# Patient Record
Sex: MALE | Race: OTHER
[De-identification: names, ages, dates, MRNs, and addresses within clinical notes are randomized per-mention and may not be internally consistent; named-entity substitution may affect disease eponyms.]

---

## 2018-01-19 ENCOUNTER — HOSPITAL ENCOUNTER (INPATIENT)
Dept: HOSPITAL 72 - SDSOVERFLO | Age: 33
Discharge: HOME | DRG: 520 | End: 2018-01-19
Payer: COMMERCIAL

## 2018-01-19 VITALS — DIASTOLIC BLOOD PRESSURE: 77 MMHG | SYSTOLIC BLOOD PRESSURE: 121 MMHG

## 2018-01-19 VITALS — DIASTOLIC BLOOD PRESSURE: 75 MMHG | SYSTOLIC BLOOD PRESSURE: 119 MMHG

## 2018-01-19 VITALS — SYSTOLIC BLOOD PRESSURE: 118 MMHG | DIASTOLIC BLOOD PRESSURE: 75 MMHG

## 2018-01-19 VITALS — SYSTOLIC BLOOD PRESSURE: 120 MMHG | DIASTOLIC BLOOD PRESSURE: 75 MMHG

## 2018-01-19 VITALS — DIASTOLIC BLOOD PRESSURE: 86 MMHG | SYSTOLIC BLOOD PRESSURE: 117 MMHG

## 2018-01-19 VITALS — SYSTOLIC BLOOD PRESSURE: 118 MMHG | DIASTOLIC BLOOD PRESSURE: 74 MMHG

## 2018-01-19 VITALS — DIASTOLIC BLOOD PRESSURE: 74 MMHG | SYSTOLIC BLOOD PRESSURE: 120 MMHG

## 2018-01-19 VITALS — DIASTOLIC BLOOD PRESSURE: 69 MMHG | SYSTOLIC BLOOD PRESSURE: 105 MMHG

## 2018-01-19 VITALS — SYSTOLIC BLOOD PRESSURE: 120 MMHG | DIASTOLIC BLOOD PRESSURE: 76 MMHG

## 2018-01-19 VITALS — WEIGHT: 182 LBS | HEIGHT: 71 IN | BODY MASS INDEX: 25.48 KG/M2

## 2018-01-19 VITALS — SYSTOLIC BLOOD PRESSURE: 177 MMHG | DIASTOLIC BLOOD PRESSURE: 85 MMHG

## 2018-01-19 DIAGNOSIS — M51.16: Primary | ICD-10-CM

## 2018-01-19 DIAGNOSIS — M48.061: ICD-10-CM

## 2018-01-19 DIAGNOSIS — K21.9: ICD-10-CM

## 2018-01-19 PROCEDURE — 4A11X4G MONITORING OF PERIPHERAL NERVOUS ELECTRICAL ACTIVITY, INTRAOPERATIVE, EXTERNAL APPROACH: ICD-10-PCS

## 2018-01-19 PROCEDURE — 36415 COLL VENOUS BLD VENIPUNCTURE: CPT

## 2018-01-19 PROCEDURE — 86900 BLOOD TYPING SEROLOGIC ABO: CPT

## 2018-01-19 PROCEDURE — 00NY0ZZ RELEASE LUMBAR SPINAL CORD, OPEN APPROACH: ICD-10-PCS

## 2018-01-19 PROCEDURE — 86850 RBC ANTIBODY SCREEN: CPT

## 2018-01-19 PROCEDURE — 86901 BLOOD TYPING SEROLOGIC RH(D): CPT

## 2018-01-19 PROCEDURE — 94003 VENT MGMT INPAT SUBQ DAY: CPT

## 2018-01-19 PROCEDURE — 72020 X-RAY EXAM OF SPINE 1 VIEW: CPT

## 2018-01-19 PROCEDURE — 87081 CULTURE SCREEN ONLY: CPT

## 2018-01-19 PROCEDURE — 0SB20ZZ EXCISION OF LUMBAR VERTEBRAL DISC, OPEN APPROACH: ICD-10-PCS

## 2018-01-19 PROCEDURE — 94150 VITAL CAPACITY TEST: CPT

## 2018-01-19 PROCEDURE — 76001: CPT

## 2018-01-19 RX ADMIN — SODIUM CHLORIDE PRN MG: 9 INJECTION, SOLUTION INTRAVENOUS at 11:35

## 2018-01-19 RX ADMIN — SODIUM CHLORIDE PRN MG: 9 INJECTION, SOLUTION INTRAVENOUS at 11:23

## 2018-01-19 RX ADMIN — SODIUM CHLORIDE PRN MG: 9 INJECTION, SOLUTION INTRAVENOUS at 11:57

## 2018-01-19 NOTE — DISCHARGE SUMMARY
DATE OF ADMISSION:  01/19/2018



DATE OF DISCHARGE:  01/19/2018



PROCEDURE PERFORMED DURING ADMISSION:  L4-L5 bilateral hemilaminotomy and

foraminotomy, left-sided L4-L5 microdiscectomy.



REASON FOR ADMISSION:  Herniated nucleus pulposus, L4-L5.



HOSPITAL COURSE/TREATMENT RENDERED:  _____



DISCHARGE PHYSICAL EXAMINATION:

1. The patient was ambulating with and without the assistance of

physical therapy.

2. Prior to discharge home, incision was clean and dry with minimal

swelling.

3. Follows commands.

4. Alert and oriented.

5. Ugalde discontinued, voiding.

6. Incentive spirometer at bedside.

7. IVF Hep-Locked.

8. Motor demonstrates expected postoperative bulk and tone.  Moves

biceps, triceps, and deltoid musculature on command.  Moves hip flexors,

quadriceps, tibialis anterior, EHL, gastrocsoleus musculature on command

as well.



TREATMENT RENDERED:

1. Daily nursing care.

2. Physical therapy.

3. Occupational therapy.

4. Intravenous medications.

5. Oral medications.

6. Daily postoperative examinations by spine surgery team.



CONDITION OF PATIENT ON DISCHARGE:  The condition on discharge is stable

for discharge to home.



DISCHARGE INSTRUCTIONS:  Our specific instructions relating to physical

activity, medications, diet, and follow-up care are detailed in our

standard operative folder and were given to this patient prior to surgery.

We will however summarize these briefly as stated below.  Regarding

physical activity, we would like the patient to limit their flexion,

extension, and rotation.  We also require a limitation on their bending,

lifting, and twisting.  All medication has been called in prior to surgery

to their pharmacy of choice.  They can resume their regular diet once

tolerated.  We would like them to shower and limit soaking the wound in a

tub/Jacuzzi/the ocean for a period of one month or until the incision is

completely healed.  We will have them follow up in our office in three

weeks' time for their regularly scheduled appointment.  They understand to

call our office tomorrow to schedule the time for their three-week

followup appointment.  The patient will notify us should he experience any

increase in the severity of pain, redness/swelling/drainage from the

incision.









  ______________________________________________

  John Kenny M.D.





DR:  Levi

D:  01/19/2018 15:49

T:  01/19/2018 17:25

JOB#:  [id number]

CC:

## 2018-01-19 NOTE — BRIEF OPERATIVE NOTE
Immediate Post Operative Note


Operative Note


Chief Complaint:  Back and leg pains left sided


Pre-op Diagnosis:


L45 herniation


Procedure:


Hemilaminotomy foraminotomy decompression Lumbar 45 Microdiscectomy


Post-op Diagnosis:  same as pre-op


Findings:  consistent w/pre-op dx studies


Surgeon:  Efraín


Assistant:  Denise


Anesthesiologist:  GURDEEP


Specimen:  none


Complications:  none


Condition:  stable


Implant(s) used?:  KATINA Giraldo Jan 19, 2018 08:23

## 2018-01-19 NOTE — PRE-PROCEDURE NOTE/ATTESTATION
Pre-Procedure Note/Attestation


Complete Prior to Procedure


Planned Procedure:  not applicable


Procedure Narrative:


Hemilaminotomy foraminotomy decompression Lumbar 45 Microdiscectomy





Indications for Procedure


Pre-Operative Diagnosis:


L45 herniation





Attestation


I attest that I discussed the nature of the procedure; its benefits; risks and 

complications; and alternatives (and the risks and benefits of such alternatives

), prior to the procedure, with the patient (or the patient's legal 

representative).





I attest that, if there was a reasonable possibility of needing a blood 

transfusion, the patient (or the patient's legal representative) was given the 

[facility] standardized written summary, pursuant 

to the [place] Blood Safety Act ([place] Health and Safety Code # 1645, as 

amended).





I attest that I re-evaluated the patient just prior to the surgery and that 

there has been no change in the patient's H&P, except as documented below:











KATINA GILL Jan 19, 2018 08:22

## 2018-01-19 NOTE — IMMEDIATE POST-OP EVALUATION
Immediate Post-Op Evalulation


Immediate Post-Op Evalulation


Procedure:  L4-L5 laminotomy with decompression


Date of Evaluation:  Jan 19, 2018


Time of Evaluation:  11:04


IV Fluids:  1200


Blood Products:  none


Estimated Blood Loss:  50


Urinary Output:  none


Blood Pressure Systolic:  116


Blood Pressure Diastolic:  58


Pulse Rate:  86


Respiratory Rate:  20


O2 Sat by Pulse Oximetry:  99


Temperature (Fahrenheit):  98.2


Pain Score (1-10):  2


Nausea:  No


Vomiting:  No


Complications


none


Patient Status:  reacts, patent, extubated, none


Hydration Status:  adequate











MINAL THOMAS M.D. Jan 19, 2018 11:05

## 2018-01-19 NOTE — ANETHESIA PREOPERATIVE EVAL
Anesthesia Pre-op PMH/ROS


General


Date of Evaluation:  Jan 19, 2018


Time of Evaluation:  09:08


Anesthesiologist:  Reid


ASA Score:  ASA 2


Mallampati Score


Class I : Soft palate, uvula, fauces, pillars visible


Class II: Soft palate, uvula, fauces visible


Class III: Soft palate, base of uvula visible


Class IV: Only hard plate visible


Mallampati Classification:  Class II


Surgeon:  Efraín


Diagnosis:  Lumbar radiculopathy


Surgical Procedure:  L4-L5 laminotomy


Anesthesia History:  none


Family History:  no anesthesia problems


Allergies:  


Coded Allergies:  


     No Known Allergies (Unverified , 1/19/18)


Medications:  see eMAR





Past Medical History


Cardiovascular:  Denies: HTN, CAD, MI, valve dz, arrhythmia, other


Pulmonary:  Denies: asthma, COPD, GABRIELA, other


Gastrointestinal/Genitourinary:  Reports: GERD - mild, 


   Denies: CRI, ESRD, other


Neurologic/Psychiatric:  Reports: other - chronic pain, 


   Denies: dementia, CVA, depression/anxiety, TIA


Endocrine:  Denies: DM, hypothyroidism, steroids, other


HEENT:  Denies: cataract (L), cataract (R), glaucoma, [place] (L), [place] (R), other


Hematology/Immune:  Denies: anemia, DVT, bleeding disorder, other


Musculoskeletal/Integumentary:  Denies: OA, RA, DJD, DDD, edema, other


PMH Narrative:


as above


PSxH Narrative:


none





Anesthesia Pre-op Phys. Exam


Physician Exam





Last Vital Signs








  Date Time  Temp Pulse Resp B/P (MAP) Pulse Ox O2 Delivery O2 Flow Rate FiO2


 


1/19/18 08:05 98.3 68 19 117/86 98 Room Air  








Constitutional:  NAD


Neurologic:  CN 2-12 intact


Cardiovascular:  RRR, no M/R/G


Respiratory:  CTA





Airway Exam


Mallampati Score:  Class II


MO:  full


Neck:  flexible


ROM:  full


Teeth:  intact











MINAL THOMAS M.D. Jan 19, 2018 10:18

## 2018-01-19 NOTE — OPERATIVE NOTE - DICTATED
DATE OF OPERATION:  01/19/2018



SURGEON:  John Kenny M.D., Orthopaedic Spine Surgeon.



ASSISTANT:  LINDA Brock.



ANESTHESIOLOGIST:  Dale Mathews M.D.



ANESTHESIA:  General endotracheal anesthesia.



PREOPERATIVE DIAGNOSES:

1. Intractable back pain.

2. Intractable leg pain.

3. Worsening radiculopathy.

4. Weakness.

5. Herniated nucleus pulposus, L4-L5 herniation.

6. Neural foraminal stenosis, L4-L5.



POSTOPERATIVE DIAGNOSES:

1. Intractable back pain.

2. Intractable leg pain.

3. Worsening radiculopathy.

4. Weakness.

5. Herniated nucleus pulposus, L4-L5 herniation.

6. Neural foraminal stenosis, L4-L5.



PROCEDURES PERFORMED:

1. Left-sided L4-L5 microdiscectomy.

2. L4-L5 hemilaminotomy and foraminotomy was done bilaterally.

3. L4-L5 neural foraminotomy through a transpedicular intraforaminal

approach.

4. Use of intraoperative microscope.

5. Supervision and interpretation of intraoperative fluoroscopy.

6. Supervision and interpretation of somatosensory-evoked potential and

free running EMG monitoring.



ESTIMATED BLOOD LOSS:  Less than 100 mL.



COMPLICATIONS:  None.



INDICATIONS FOR THE PROCEDURE:  Adrian presents for intractable back pain and

radiculopathy.  The patient tried and failed a prolonged course of

conservative management, including but not limited to chiropractic

therapy, physical therapy, nonsteroidal anti-inflammatory drugs,

medication, ice packs as well as epidural injection.  Despite these

therapies, the patient still developed recalcitrant pain and elected for

definitive management in the form of left-sided L4-L5 microdiscectomy,

L4-L5 hemilaminotomy and foraminotomy was done bilaterally, L4-L5 neural

foraminotomy through a transpedicular intraforaminal approach.



We had a long discussion with him regarding definitive surgical

treatment options.  The patient's MRI demonstrated herniated nucleus

pulposus, L4-L5 herniation, neural foraminal stenosis, L4-L5, and as a

result, I felt he would benefit from the discectomy as well as neural

foraminotomy at this level.  We had a long discussion with the patient

regarding the risks, alternatives, and benefits of surgery.



Our description of the risks included a discussion in person as well as

a signed consent which detailed all pertinent risks and the procedure

itself.  Briefly, our discussion included but was not limited to

infection, bleeding, pseudarthrosis, spinal cord injury, neurovascular

injury, dural tear, CSF leak, neuropathy, paralysis, permanent

weakness/drop foot, paresthesias, blindness, palsy, and weakness.  The

patient understood there may be a need for revision surgery or additional

procedures.  Approach-related complications including dysphonia,

dysphagia, blindness, permanent vocal cord and neural injury, hematoma,

swallowing and breathing difficulty.  Medical complications including

liver, kidney, shock, and cardiopulmonary failure.  Anesthesia

complications including death, swelling.  Damage to the musculature,

larynx (voice injury or loss), esophagus (throat), trachea, blood vessels

and muscles (muscular sprain) and lungs (pneumothorax) during this

surgical procedure.  Injury to deeper structures may be temporary or

permanent.  The patient understood these and elected to proceed.  A

written and verbal consent was given.  We discussed the pros and cons of

all the alternatives.



We discussed the uncertainties associated with the decision.

Afterwards, I assessed the patient's understanding and explored their

preferences.  All questions were answered and no guarantees were given.

Medical clearance was obtained prior to surgery.



OPERATIVE FINDINGS:  A broad-based disc herniation at L4-L5, which

encroached on the thecal sac and neural foraminal elements therein.  This

disc was acute in nature and not calcified.  It was mobile and free

floating and resected easily.  There was also neural foraminal stenosis at

L4-L5.



DESCRIPTION OF PROCEDURE:  Under the benefit of general endotracheal

anesthesia and with the assistance of the entire operative team, the

patient was moved from the [facility] onto the operative table in the prone

position on a Juan frame.  The head was secured and positioned

appropriately.  Bilateral arms were secured with Gel Pads and foam and all

bony prominences were padded.  The bilateral lower extremity SCD and SEB

hose were placed for DVT prophylaxis.  A surgical timeout was called which

corroborated our planned procedure.  Preoperative antibiotics were

administered within 30 minutes of the incision for prophylaxis.  Decadron

was given for preoperative steroids.



Using lateral radiography, the operative levels were delineated.  An

incision was marked based on our interpretation of lateral radiography and

afterwards, the body was prepped and draped in the usual sterile manner.

The family was notified that we were ready to commence surgery and were

called in the waiting room hourly for updates.



An incision was based on our lateral fluoroscopic image to center the

incision at the L5-S1 interspace.  The wound was prepped and draped in the

usual sterile fashion.  Using a scalpel, a midline incision was taken down

through the skin and subcutaneous tissues until the overlying hemilamina

of L4-L5 were visualized.  Next, using meticulous hemostasis,

hemilamotomies were dissected and retractors were placed.  Using a HackSurfer

dental, we confirmed placement at the L4-L5 interspace.  We next turned

our attention to our decompression.



A standard hemilaminotomy and foraminotomy was performed at each level

in standard fashion using a Midas-Rick type AM8 drill bit, straight and

angled curettage, and Kerrison 4 rongeurs until the lateral thecal sac

margin and traversing nerve root was visualized.  All remainders of the

ligamentum flavum and lateral bony margins were resected in total with

angled curettage and Kerrison 4 rongeurs until the lateral thecal sac

margin and traversing nerve root was visualized and decompressed.



We next turned our attention toward our L4-L5 microdiscectomy on the

left side.  A Penfield 4 was used to gently mobilize the thecal sac

medially and this was held retracted with a bayonet nerve root retractor.

It was at this point that we noted a large broad-based disc protrusion

with encroachment dorsally on the thecal sac, neural foraminal contents.

A bayonet nerve root retractor was then placed carefully to retract the

thecal sac and a discectomy was performed using a combination of a

long-handled 15 blade scalpel, downgoing and straight pituitaries, and

downgoing curettage.  Afterward, the disc space was irrigated twice with

20 mL of antibiotic-impregnated saline.  All loose and free-floating disc

fragments were carefully resected with a narrow pituitary.



Having been satisfied with our decompression after our discectomy of all

neural elements, we next turned our attention to our neural

foraminoplasty/foraminotomy.  This was performed through the use of kerrison 
rongeurs and pituitaries which allowed for

re-creation of the neural foraminal arch at L4-L5.  Afterwards, hemostasis

was obtained with 60 mL of antibiotic-impregnated saline followed by

FloSeal and Gelfoam.  After sponge and needle count were found to be

correct, next we turned our attention to closure.  Closure consisted of

1-0 Vicryl in standard interrupted fashion.  Zosyn was placed deep to the

fascia and superficial to the fascia for antibiotic prophylaxis.  Skin

closure was performed with 2-0 Vicryl in interrupted fashion followed by

running Monocryl for the skin.  Final dressings consisted of Dermabond for

the superficial skin, Telfa, and Tegaderm.  The patient tolerated the

procedure well.  The patient was extubated after the conclusion of surgery

without incident.



We discussed the findings of the surgery with the family upon completion

of the case.  At this point, the patient will be transferred to the spine

floor for further observation.









  ______________________________________________

  John Kenny M.D.





DR:  Levi

D:  01/19/2018 15:48

T:  01/19/2018 16:40

JOB#:  [id number]

CC:



AISHWARYA

## 2018-01-19 NOTE — 48 HOUR POST ANESTHESIA EVAL
Post Anesthesia Evaluation


Procedure:  L4-L5 laminotomy with decompression


Date of Evaluation:  Jan 21, 2018


Time of Evaluation:  09:00


Blood Pressure Systolic:  177


0:  85


Pulse Rate:  75


Respiratory Rate:  17


Temperature (Fahrenheit):  98


O2 Sat by Pulse Oximetry:  99


Airway:  patent


Nausea:  No


Vomiting:  No


Pain Intensity:  0


Hydration Status:  adequate


Mental Status/LOC:  patient returned to baseline


Post-Anesthesia Complications:


none


Follow-up care needed:  patient intructions given











Mark Anthony Perez M.D. Jan 19, 2018 18:41

## 2018-01-20 NOTE — DIAGNOSTIC IMAGING REPORT
Indication: Reason For Exam: PAIN

 

Technique: XRAY L Spine 1V intraoperative. One image provided. Study performed by Dr. Kenny including fluoroscopy.

 

Dose:

Fluoroscopy time: 3.2 seconds.

Cumulative dose: 1.03 mGy

 

Comparison: None.

 

Findings: Single view demonstrates a surgical instrument posterior to L4-5.

 

Impression: Localization of L4-5.